# Patient Record
(demographics unavailable — no encounter records)

---

## 2025-02-03 NOTE — HISTORY OF PRESENT ILLNESS
[de-identified] : Patient LILA TORRES 30-year-old MALE who complains of bilateral lumbar pain x 2  year after getting injured weight lifting . On initial presentation symptoms were as follows: Patient described the pain as intermittent. Patient rated the pain as [4 out of 10]. The pain localized to lower back. There is radiation of pain and weakness to the right extremities.  No history of spinal surgery or interventions  currently exercising

## 2025-02-03 NOTE — PHYSICAL EXAM
[de-identified] : Physical Exam:  General: patient is well developed, well nourished, in no acute  distress, alert and oriented x 3.   Mood and affect: normal  Respiratory: no respiratory distress noted  Skin: no scars over spine, skin intact, no erythema, increased warmth  Alignment:The spine is well compensated in the coronal and sagittal plane.    Gait: The patient is able to toe walk and heel walk without difficulty. The patient is able to tandem gait without difficutly.  Palpation: no tenderness to palpation spine or paraspinal region  Range of motion: Lumbar spine ROM is full  Neurologic Exam: Motor: Manual Muscle testing in the lower extremities is 5 out of 5 in all muscle groups. There is no evidence of muscular atrophy in the lower extremities. Sensory: Sensation to light touch is grossly intact in the lower extremities  Reflexes: DTR are present and symmetric throughout, no clonus, plantar responses are flexor  Hip Exam: No pain with internal or external rotation of hips bilaterally  Special tests: Straight leg raise test negative.  Cross straight leg test negative.  HORACE test negative  Vascular: Examination of the peripheral vascular system demonstrates no evidence of congestion or edema. no evidence of lymphedema bilateral lower extremities, pulses are present and symmetric in both lower extremities. [de-identified] : XR 02/03/2025 (my read): no significant disc degeneration, instability seen, no fractures  MRI Lumbar Spine [facility] 08/17/2023 my read: congenital stenosis; left L4/5 disc herniation paracentral with severe lateral recess stneosis; disc bulging L5/S1 with mild stenosis; degenerative disc disease L4-S1

## 2025-02-03 NOTE — DISCUSSION/SUMMARY
[de-identified] : Congenital Stenosis; L4-S1 disc degeneration; L4-5 disc herniation; spinal stenosis; -repeat MRI -f/u testing